# Patient Record
Sex: FEMALE | URBAN - METROPOLITAN AREA
[De-identification: names, ages, dates, MRNs, and addresses within clinical notes are randomized per-mention and may not be internally consistent; named-entity substitution may affect disease eponyms.]

---

## 2018-02-22 ENCOUNTER — TRANSFERRED RECORDS (OUTPATIENT)
Dept: HEALTH INFORMATION MANAGEMENT | Facility: CLINIC | Age: 23
End: 2018-02-22

## 2019-06-05 ENCOUNTER — TRANSFERRED RECORDS (OUTPATIENT)
Dept: HEALTH INFORMATION MANAGEMENT | Facility: CLINIC | Age: 24
End: 2019-06-05

## 2019-06-26 ENCOUNTER — OFFICE VISIT (OUTPATIENT)
Dept: FAMILY MEDICINE | Facility: CLINIC | Age: 24
End: 2019-06-26
Payer: COMMERCIAL

## 2019-06-26 VITALS
BODY MASS INDEX: 24.45 KG/M2 | HEIGHT: 69 IN | DIASTOLIC BLOOD PRESSURE: 70 MMHG | WEIGHT: 165.1 LBS | HEART RATE: 92 BPM | OXYGEN SATURATION: 99 % | SYSTOLIC BLOOD PRESSURE: 111 MMHG | TEMPERATURE: 98.2 F

## 2019-06-26 DIAGNOSIS — Z13.220 LIPID SCREENING: ICD-10-CM

## 2019-06-26 DIAGNOSIS — Z13.1 SCREENING FOR DIABETES MELLITUS: ICD-10-CM

## 2019-06-26 DIAGNOSIS — Z23 VARICELLA VACCINE: Primary | ICD-10-CM

## 2019-06-26 LAB
CHOLEST SERPL-MCNC: 186 MG/DL
HBA1C MFR BLD: 5.1 % (ref 0–5.6)
HDLC SERPL-MCNC: 49 MG/DL
LDLC SERPL CALC-MCNC: 118 MG/DL
NONHDLC SERPL-MCNC: 137 MG/DL
TRIGL SERPL-MCNC: 97 MG/DL

## 2019-06-26 PROCEDURE — 80061 LIPID PANEL: CPT | Performed by: INTERNAL MEDICINE

## 2019-06-26 PROCEDURE — 99203 OFFICE O/P NEW LOW 30 MIN: CPT | Performed by: INTERNAL MEDICINE

## 2019-06-26 PROCEDURE — 36415 COLL VENOUS BLD VENIPUNCTURE: CPT | Performed by: INTERNAL MEDICINE

## 2019-06-26 PROCEDURE — 83036 HEMOGLOBIN GLYCOSYLATED A1C: CPT | Performed by: INTERNAL MEDICINE

## 2019-06-26 PROCEDURE — 86787 VARICELLA-ZOSTER ANTIBODY: CPT | Performed by: INTERNAL MEDICINE

## 2019-06-26 ASSESSMENT — MIFFLIN-ST. JEOR: SCORE: 1565.33

## 2019-06-26 NOTE — PROGRESS NOTES
"Subjective     Serena Graves is a 23 year old female who presents to clinic today for the following health issues:    HPI   New Patient/Transfer of Care    Needs updated Varicella    There is no problem list on file for this patient.    Past Surgical History:   Procedure Laterality Date     ELBOW SURGERY Right     had surgery at ?9 or 10 years of age, had ?ORIF       Social History     Tobacco Use     Smoking status: Never Smoker     Smokeless tobacco: Never Used   Substance Use Topics     Alcohol use: Not Currently     Family History   Problem Relation Age of Onset     Scoliosis Mother      Impaired Fasting Glucose Father      Scoliosis Brother          No current outpatient medications on file.     No Known Allergies  No lab results found.   BP Readings from Last 3 Encounters:   06/26/19 111/70    Wt Readings from Last 3 Encounters:   06/26/19 74.9 kg (165 lb 1.6 oz)                  Reviewed and updated as needed this visit by Provider  Tobacco  Allergies  Med Hx  Fam Hx  Soc Hx        Review of Systems   ROS COMP: Constitutional, HEENT, cardiovascular, pulmonary, gi and gu systems are negative, except as otherwise noted.      Objective    /70 (BP Location: Right arm, Cuff Size: Adult Regular)   Pulse 92   Temp 98.2  F (36.8  C) (Oral)   Ht 1.74 m (5' 8.5\")   Wt 74.9 kg (165 lb 1.6 oz)   LMP 05/30/2019 (Approximate)   SpO2 99%   BMI 24.74 kg/m    Body mass index is 24.74 kg/m .  Physical Exam   GENERAL: healthy, alert and no distress  EYES: Eyes grossly normal to inspection, PERRL and conjunctivae and sclerae normal, wears glasses  NECK: no adenopathy, no asymmetry, masses, or scars and thyroid normal to palpation  RESP: lungs clear to auscultation - no rales, rhonchi or wheezes  CV: regular rate and rhythm, normal S1 S2, no S3 or S4, no murmur, click or rub, no peripheral edema and peripheral pulses strong  ABDOMEN: soft, nontender, no hepatosplenomegaly, no masses and bowel sounds normal  MS: no " gross musculoskeletal defects noted, no edema, has scar on right elbow [from old injury]   NEURO: Normal strength and tone, mentation intact and speech normal  PSYCH: mentation appears normal, affect normal/bright            Assessment & Plan     Serena was seen today for establish care.    Diagnoses and all orders for this visit:    Varicella vaccine  -     Varicella Zoster Virus Antibody IgG    Screening for diabetes mellitus  -     Hemoglobin A1c    Lipid screening  -     Lipid panel reflex to direct LDL Fasting      Clinical decision-making she has no concerns during today's visit.  History of right elbow surgery giving her some limitation of range of motion but no pain, denies any numbness or tingling in her right hand or weakness.  She has no particular concerns, she had 2 varicella vaccines in the past and she would like to get revaccinated; advised that we will check a vaccine titer first.  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  She is not requesting a full physical exam as she will be getting one in August.  She does wear glasses for nearsightedness has no particular concerns otherwise.  On review of her systems were negative as listed above.    Work on weight loss  Regular exercise  See Patient Instructions    No follow-ups on file.    Alexis Viramontes MD  [facility]

## 2019-06-27 ENCOUNTER — TELEPHONE (OUTPATIENT)
Dept: FAMILY MEDICINE | Facility: CLINIC | Age: 24
End: 2019-06-27

## 2019-06-27 LAB — VZV IGG SER QL IA: 1 AI (ref 0–0.8)

## 2019-06-27 NOTE — TELEPHONE ENCOUNTER
Result Notes for Varicella Zoster Virus Antibody IgG     Notes recorded by Alexis Viramontes MD on 6/27/2019 at 1:20 PM CDT  VARICELLA TITRE IS EQUIVOCAL, SHE CAN EITHER GET THE VACCINE AGAIN OR RE-COLLECT BLOOD FOR TITRE..     Equivocal, please recollect.   Antibody index (AI) values reflect qualitative changes in antibody   concentration that cannot be directly associated with clinical condition or   disease state.     Spoke with patient - she will come in for vaccine - added to nurse-only schedule     Tammy WHEELER RN

## 2019-06-28 ENCOUNTER — ALLIED HEALTH/NURSE VISIT (OUTPATIENT)
Dept: NURSING | Facility: CLINIC | Age: 24
End: 2019-06-28
Payer: COMMERCIAL

## 2019-06-28 DIAGNOSIS — Z23 NEED FOR VACCINATION: Primary | ICD-10-CM

## 2019-06-28 PROCEDURE — 90716 VAR VACCINE LIVE SUBQ: CPT

## 2019-06-28 PROCEDURE — 90471 IMMUNIZATION ADMIN: CPT

## 2019-06-28 PROCEDURE — 99207 ZZC NO CHARGE LOS: CPT

## 2019-06-28 NOTE — PROGRESS NOTES
Varicella per 6-26-19 lab result note     Screening Questionnaire for Adult Immunization    Are you sick today?   No   Do you have allergies to medications, food, a vaccine component or latex?   No   Have you ever had a serious reaction after receiving a vaccination?   No   Do you have a long-term health problem with heart disease, lung disease, asthma, kidney disease, metabolic disease (e.g. diabetes), anemia, or other blood disorder?   No   Do you have cancer, leukemia, HIV/AIDS, or any other immune system problem?   No   In the past 3 months, have you taken medications that affect  your immune system, such as prednisone, other steroids, or anticancer drugs; drugs for the treatment of rheumatoid arthritis, Crohn s disease, or psoriasis; or have you had radiation treatments?   No   Have you had a seizure, or a brain or other nervous system problem?   No   During the past year, have you received a transfusion of blood or blood     products, or been given immune (gamma) globulin or antiviral drug?   No   For women: Are you pregnant or is there a chance you could become        pregnant during the next month?   No   Have you received any vaccinations in the past 4 weeks?   Yes non live     Immunization questionnaire was positive for at least one answer.  Notified no one.        Per orders of Dr. Viramontes , injection of Chicken Pox  given by Delicia Alvarado. Patient instructed to remain in clinic for 15 minutes afterwards, and to report any adverse reaction to me immediately.       Screening performed by Delicia Alvarado on 6/28/2019 at 3:25 PM.

## 2019-11-19 ENCOUNTER — MYC MEDICAL ADVICE (OUTPATIENT)
Dept: FAMILY MEDICINE | Facility: CLINIC | Age: 24
End: 2019-11-19

## 2019-11-20 NOTE — TELEPHONE ENCOUNTER
Patient has moved to [place] and has asked to be removed from PCP and [place].     Torrey Hooks CMA on 11/19/2019 at 6:27 PM

## 2020-02-24 ENCOUNTER — HEALTH MAINTENANCE LETTER (OUTPATIENT)
Age: 25
End: 2020-02-24

## 2020-12-13 ENCOUNTER — HEALTH MAINTENANCE LETTER (OUTPATIENT)
Age: 25
End: 2020-12-13

## 2021-04-17 ENCOUNTER — HEALTH MAINTENANCE LETTER (OUTPATIENT)
Age: 26
End: 2021-04-17

## 2021-09-26 ENCOUNTER — HEALTH MAINTENANCE LETTER (OUTPATIENT)
Age: 26
End: 2021-09-26

## 2022-05-08 ENCOUNTER — HEALTH MAINTENANCE LETTER (OUTPATIENT)
Age: 27
End: 2022-05-08

## 2023-01-14 ENCOUNTER — HEALTH MAINTENANCE LETTER (OUTPATIENT)
Age: 28
End: 2023-01-14

## 2023-06-02 ENCOUNTER — HEALTH MAINTENANCE LETTER (OUTPATIENT)
Age: 28
End: 2023-06-02